# Patient Record
Sex: MALE | Race: WHITE | NOT HISPANIC OR LATINO | Employment: UNEMPLOYED | URBAN - METROPOLITAN AREA
[De-identification: names, ages, dates, MRNs, and addresses within clinical notes are randomized per-mention and may not be internally consistent; named-entity substitution may affect disease eponyms.]

---

## 2017-07-06 ENCOUNTER — HOSPITAL ENCOUNTER (EMERGENCY)
Facility: CLINIC | Age: 22
Discharge: HOME | End: 2017-07-06
Attending: INTERNAL MEDICINE | Admitting: INTERNAL MEDICINE

## 2017-07-06 VITALS
HEART RATE: 87 BPM | DIASTOLIC BLOOD PRESSURE: 60 MMHG | RESPIRATION RATE: 16 BRPM | WEIGHT: 201.13 LBS | BODY MASS INDEX: 29.28 KG/M2 | TEMPERATURE: 96.7 F | SYSTOLIC BLOOD PRESSURE: 124 MMHG | OXYGEN SATURATION: 98 %

## 2017-07-06 DIAGNOSIS — S09.8XXA OTHER SPECIFIED INJURIES OF HEAD, INITIAL ENCOUNTER: ICD-10-CM
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
## 2017-07-06 PROCEDURE — 99207 ZZC APP CREDIT; MD BILLING SHARED VISIT: CPT | Mod: Z6 | Performed by: INTERNAL MEDICINE

## 2017-07-06 PROCEDURE — 40000268 ZZH STATISTIC NO CHARGES: Performed by: INTERNAL MEDICINE

## 2017-07-07 NOTE — ED NOTES
"The patient appeared on the ED list as \"ready to be seen by MD\" in hallway 2. I did not find him in the hallway on several passes through the department. Charge nurse did not know where he was. After further investigation, the triage nurse states she called him several times in the lobby and that he did not respond, therefore was never brought back to the department.    He was never seen by the ED MD and apparently eloped from  the department after triage.     Jason Ruiz MD  07/06/17 2100    "

## 2023-12-13 ENCOUNTER — HOSPITAL ENCOUNTER (EMERGENCY)
Facility: CLINIC | Age: 28
Discharge: HOME | End: 2023-12-13
Attending: EMERGENCY MEDICINE | Admitting: EMERGENCY MEDICINE

## 2023-12-13 VITALS
WEIGHT: 190 LBS | BODY MASS INDEX: 25.73 KG/M2 | RESPIRATION RATE: 18 BRPM | HEIGHT: 72 IN | DIASTOLIC BLOOD PRESSURE: 85 MMHG | OXYGEN SATURATION: 99 % | SYSTOLIC BLOOD PRESSURE: 141 MMHG | HEART RATE: 100 BPM | TEMPERATURE: 98.2 F
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
## 2023-12-13 DIAGNOSIS — S01.85XA OPEN WOUND OF FACE DUE TO DOG BITE: ICD-10-CM

## 2023-12-13 PROCEDURE — 90471 IMMUNIZATION ADMIN: CPT | Performed by: EMERGENCY MEDICINE

## 2023-12-13 PROCEDURE — 90715 TDAP VACCINE 7 YRS/> IM: CPT | Performed by: EMERGENCY MEDICINE

## 2023-12-13 PROCEDURE — 99283 EMERGENCY DEPT VISIT LOW MDM: CPT | Performed by: EMERGENCY MEDICINE

## 2023-12-13 PROCEDURE — 99283 EMERGENCY DEPT VISIT LOW MDM: CPT | Mod: 25 | Performed by: EMERGENCY MEDICINE

## 2023-12-13 PROCEDURE — 250N000011 HC RX IP 250 OP 636: Performed by: EMERGENCY MEDICINE

## 2023-12-13 RX ORDER — AMOXICILLIN AND CLAVULANATE POTASSIUM 500; 125 MG/1; MG/1
1 TABLET, FILM COATED ORAL 3 TIMES DAILY
Qty: 30 TABLET | Refills: 0 | Status: SHIPPED | OUTPATIENT
Start: 2023-12-13 | End: 2023-12-23

## 2023-12-13 RX ADMIN — CLOSTRIDIUM TETANI TOXOID ANTIGEN (FORMALDEHYDE INACTIVATED), CORYNEBACTERIUM DIPHTHERIAE TOXOID ANTIGEN (FORMALDEHYDE INACTIVATED), BORDETELLA PERTUSSIS TOXOID ANTIGEN (GLUTARALDEHYDE INACTIVATED), BORDETELLA PERTUSSIS FILAMENTOUS HEMAGGLUTININ ANTIGEN (FORMALDEHYDE INACTIVATED), BORDETELLA PERTUSSIS PERTACTIN ANTIGEN, AND BORDETELLA PERTUSSIS FIMBRIAE 2/3 ANTIGEN 0.5 ML: 5; 2; 2.5; 5; 3; 5 INJECTION, SUSPENSION INTRAMUSCULAR at 13:10

## 2023-12-13 ASSESSMENT — ACTIVITIES OF DAILY LIVING (ADL): ADLS_ACUITY_SCORE: 33

## 2023-12-13 NOTE — ED PROVIDER NOTES
[facility] EMERGENCY DEPARTMENT ([facility])    12/13/23       ED PROVIDER NOTE    History     Chief Complaint   Patient presents with    Dog Bite     HPI  Miguel Chirinos is a 28 year old male with  whose last examination was more than 5 years ago who presents emerged part with complaints of a dog bite to his face. Patient states that he got into an argument with his girlfriend and his dog got excited and he tried to hug his dog to keep his dog under control but the dog bit him in the upper lip. The patient presents here to the ER for evaluation. Patient states that the dog has had his immunizations.  This part of the document was transcribed by Nancy Hanson Scribtiera.     Per [facility] records, patient's last Tdap was in 08/01/2012.    Past Medical History  No past medical history on file.    No past surgical history on file.    NO ACTIVE MEDICATIONS  permethrin (ELIMITE) 5 % cream      No Known Allergies    Family History    No family history on file.    Social History   Social History     Tobacco Use    Smoking status: Every Day     Packs/day: 1     Types: Cigarettes    Smokeless tobacco: Never   Substance Use Topics    Alcohol use: No    Drug use: Yes     Comment: marijuana         A medically appropriate review of systems was performed with pertinent positives and negatives noted in the HPI, and all other systems negative.    Physical Exam   BP: (!) 141/85  Pulse: 100  Temp: 98.2  F (36.8  C)  Resp: 18  Height: 182.9 cm (6')  Weight: 86.2 kg (190 lb)  SpO2: 99 %  Physical Exam  Vitals and nursing note reviewed.   HENT:      Head: Atraumatic.      Comments: Except for the perioral area  Eyes:      Extraocular Movements: Extraocular movements intact.      Pupils: Pupils are equal, round, and reactive to light.   Pulmonary:      Effort: No respiratory distress.   Musculoskeletal:         General: No deformity.      Cervical back: Neck supple.   Skin:     Comments: Patient has fairly superficial  bite marks to the upper lip 1 near the right nostril and 1 near the left nostril neither of which require surgical repair   Neurological:      General: No focal deficit present.      Mental Status: He is alert and oriented to person, place, and time.   Psychiatric:         Mood and Affect: Mood normal.         ED Course, Procedures, & Data      Procedures         Medications   Tdap (tetanus-diphtheria-acell pertussis) (ADACEL) injection 0.5 mL (has no administration in time range)     Orders Placed This Encounter   Procedures    Primary Care Referral    Special Care Nursing         Critical care was not performed.     Medical Decision Making  The patient's presentation was of low complexity (an acute and uncomplicated illness or injury).    The patient's evaluation involved:  history and exam without other MDM data elements    The patient's management necessitated moderate risk (prescription drug management including medications given in the ED).    Assessment & Plan      I have reviewed the nursing notes.     I have reviewed the findings, diagnosis, plan and need for follow up with the patient.    New Prescriptions    AMOXICILLIN-CLAVULANATE (AUGMENTIN) 500-125 MG TABLET    Take 1 tablet by mouth 3 times daily for 10 days       Final diagnoses:   Open wound of face due to dog bite     Observe your dog for illness    Fill your prescription and take as directed    Please make an appointment to follow up with your primary care referral or Your Primary Care Provider in 10- 14 days if you have any concerns.    Routine discharge instructions were given for this diagnosis    [facility] EMERGENCY DEPARTMENT  12/13/2023     Rc Rolon MD  12/13/23 4537

## 2023-12-13 NOTE — ED TRIAGE NOTES
Dog bite to face. Punctures to upper lip x4. Pt states dog has had one round of rabies vaccines. VSS

## 2023-12-13 NOTE — DISCHARGE INSTRUCTIONS
Observe your dog for illness    Fill your prescription and take as directed    Please make an appointment to follow up with your primary care referral or Your Primary Care Provider in 10- 14 days if you have any concerns.

## 2025-07-22 ENCOUNTER — HOSPITAL ENCOUNTER (EMERGENCY)
Facility: CLINIC | Age: 30
Discharge: OTHER INSTITUTION | End: 2025-07-23
Attending: EMERGENCY MEDICINE
Payer: MEDICAID

## 2025-07-22 DIAGNOSIS — R45.851 DEPRESSION WITH SUICIDAL IDEATION: ICD-10-CM

## 2025-07-22 DIAGNOSIS — F32.A DEPRESSION WITH SUICIDAL IDEATION: ICD-10-CM

## 2025-07-22 PROCEDURE — 99285 EMERGENCY DEPT VISIT HI MDM: CPT | Performed by: EMERGENCY MEDICINE

## 2025-07-22 ASSESSMENT — COLUMBIA-SUICIDE SEVERITY RATING SCALE - C-SSRS
4. HAVE YOU HAD THESE THOUGHTS AND HAD SOME INTENTION OF ACTING ON THEM?: YES
1. IN THE PAST MONTH, HAVE YOU WISHED YOU WERE DEAD OR WISHED YOU COULD GO TO SLEEP AND NOT WAKE UP?: YES
3. HAVE YOU BEEN THINKING ABOUT HOW YOU MIGHT KILL YOURSELF?: YES
5. HAVE YOU STARTED TO WORK OUT OR WORKED OUT THE DETAILS OF HOW TO KILL YOURSELF? DO YOU INTEND TO CARRY OUT THIS PLAN?: NO
2. HAVE YOU ACTUALLY HAD ANY THOUGHTS OF KILLING YOURSELF IN THE PAST MONTH?: YES
6. HAVE YOU EVER DONE ANYTHING, STARTED TO DO ANYTHING, OR PREPARED TO DO ANYTHING TO END YOUR LIFE?: YES

## 2025-07-22 ASSESSMENT — ACTIVITIES OF DAILY LIVING (ADL): ADLS_ACUITY_SCORE: 41

## 2025-07-23 ENCOUNTER — TELEPHONE (OUTPATIENT)
Dept: BEHAVIORAL HEALTH | Facility: CLINIC | Age: 30
End: 2025-07-23

## 2025-07-23 ENCOUNTER — HOSPITAL ENCOUNTER (INPATIENT)
Facility: CLINIC | Age: 30
End: 2025-07-23
Attending: PSYCHIATRY & NEUROLOGY | Admitting: PSYCHIATRY & NEUROLOGY
Payer: MEDICAID

## 2025-07-23 VITALS
WEIGHT: 200 LBS | SYSTOLIC BLOOD PRESSURE: 125 MMHG | RESPIRATION RATE: 17 BRPM | BODY MASS INDEX: 27.09 KG/M2 | TEMPERATURE: 97.4 F | DIASTOLIC BLOOD PRESSURE: 86 MMHG | HEIGHT: 72 IN | HEART RATE: 70 BPM | OXYGEN SATURATION: 99 %

## 2025-07-23 PROBLEM — F33.9 RECURRENT MAJOR DEPRESSION: Status: ACTIVE | Noted: 2025-07-23

## 2025-07-23 PROBLEM — R45.851 SUICIDAL IDEATION: Status: ACTIVE | Noted: 2025-07-23

## 2025-07-23 PROBLEM — F43.9 TRAUMA AND STRESSOR-RELATED DISORDER: Status: ACTIVE | Noted: 2025-07-23

## 2025-07-23 LAB
ALBUMIN SERPL BCG-MCNC: 3.9 G/DL (ref 3.5–5.2)
ALP SERPL-CCNC: 69 U/L (ref 40–150)
ALT SERPL W P-5'-P-CCNC: 12 U/L (ref 0–70)
AMPHETAMINES UR QL SCN: ABNORMAL
ANION GAP SERPL CALCULATED.3IONS-SCNC: 11 MMOL/L (ref 7–15)
AST SERPL W P-5'-P-CCNC: 17 U/L (ref 0–45)
BARBITURATES UR QL SCN: ABNORMAL
BASOPHILS # BLD AUTO: 0.1 10E3/UL (ref 0–0.2)
BASOPHILS NFR BLD AUTO: 1 %
BENZODIAZ UR QL SCN: ABNORMAL
BILIRUB SERPL-MCNC: 0.3 MG/DL
BUN SERPL-MCNC: 12.5 MG/DL (ref 6–20)
BZE UR QL SCN: ABNORMAL
CALCIUM SERPL-MCNC: 8.6 MG/DL (ref 8.8–10.4)
CANNABINOIDS UR QL SCN: ABNORMAL
CHLORIDE SERPL-SCNC: 105 MMOL/L (ref 98–107)
CREAT SERPL-MCNC: 0.82 MG/DL (ref 0.67–1.17)
EGFRCR SERPLBLD CKD-EPI 2021: >90 ML/MIN/1.73M2
EOSINOPHIL # BLD AUTO: 0.2 10E3/UL (ref 0–0.7)
EOSINOPHIL NFR BLD AUTO: 3 %
ERYTHROCYTE [DISTWIDTH] IN BLOOD BY AUTOMATED COUNT: 12.9 % (ref 10–15)
FENTANYL UR QL: ABNORMAL
GLUCOSE SERPL-MCNC: 100 MG/DL (ref 70–99)
HCO3 SERPL-SCNC: 24 MMOL/L (ref 22–29)
HCT VFR BLD AUTO: 40.9 % (ref 40–53)
HGB BLD-MCNC: 14.1 G/DL (ref 13.3–17.7)
IMM GRANULOCYTES # BLD: 0 10E3/UL
IMM GRANULOCYTES NFR BLD: 0 %
LYMPHOCYTES # BLD AUTO: 2.7 10E3/UL (ref 0.8–5.3)
LYMPHOCYTES NFR BLD AUTO: 34 %
MCH RBC QN AUTO: 31.4 PG (ref 26.5–33)
MCHC RBC AUTO-ENTMCNC: 34.5 G/DL (ref 31.5–36.5)
MCV RBC AUTO: 91 FL (ref 78–100)
MONOCYTES # BLD AUTO: 0.7 10E3/UL (ref 0–1.3)
MONOCYTES NFR BLD AUTO: 9 %
NEUTROPHILS # BLD AUTO: 4.1 10E3/UL (ref 1.6–8.3)
NEUTROPHILS NFR BLD AUTO: 53 %
NRBC # BLD AUTO: 0 10E3/UL
NRBC BLD AUTO-RTO: 0 /100
OPIATES UR QL SCN: ABNORMAL
PCP QUAL URINE (ROCHE): ABNORMAL
PLATELET # BLD AUTO: 226 10E3/UL (ref 150–450)
POTASSIUM SERPL-SCNC: 3.8 MMOL/L (ref 3.4–5.3)
PROT SERPL-MCNC: 6 G/DL (ref 6.4–8.3)
RBC # BLD AUTO: 4.49 10E6/UL (ref 4.4–5.9)
SODIUM SERPL-SCNC: 140 MMOL/L (ref 135–145)
WBC # BLD AUTO: 7.7 10E3/UL (ref 4–11)

## 2025-07-23 PROCEDURE — 85025 COMPLETE CBC W/AUTO DIFF WBC: CPT | Performed by: EMERGENCY MEDICINE

## 2025-07-23 PROCEDURE — 124N000002 HC R&B MH UMMC

## 2025-07-23 PROCEDURE — H2032 ACTIVITY THERAPY, PER 15 MIN: HCPCS

## 2025-07-23 PROCEDURE — 36415 COLL VENOUS BLD VENIPUNCTURE: CPT | Performed by: EMERGENCY MEDICINE

## 2025-07-23 PROCEDURE — 99223 1ST HOSP IP/OBS HIGH 75: CPT | Mod: AI | Performed by: PSYCHIATRY & NEUROLOGY

## 2025-07-23 PROCEDURE — 250N000013 HC RX MED GY IP 250 OP 250 PS 637

## 2025-07-23 PROCEDURE — 80307 DRUG TEST PRSMV CHEM ANLYZR: CPT | Performed by: EMERGENCY MEDICINE

## 2025-07-23 PROCEDURE — 93010 ELECTROCARDIOGRAM REPORT: CPT | Performed by: INTERNAL MEDICINE

## 2025-07-23 PROCEDURE — 80053 COMPREHEN METABOLIC PANEL: CPT | Performed by: EMERGENCY MEDICINE

## 2025-07-23 PROCEDURE — 250N000013 HC RX MED GY IP 250 OP 250 PS 637: Performed by: EMERGENCY MEDICINE

## 2025-07-23 RX ORDER — POLYETHYLENE GLYCOL 3350 17 G
2 POWDER IN PACKET (EA) ORAL
Status: DISCONTINUED | OUTPATIENT
Start: 2025-07-23 | End: 2025-07-23

## 2025-07-23 RX ORDER — OLANZAPINE 10 MG/1
10 TABLET, FILM COATED ORAL 3 TIMES DAILY PRN
Status: DISCONTINUED | OUTPATIENT
Start: 2025-07-23 | End: 2025-07-25 | Stop reason: HOSPADM

## 2025-07-23 RX ORDER — AMOXICILLIN 250 MG
1 CAPSULE ORAL 2 TIMES DAILY PRN
Status: DISCONTINUED | OUTPATIENT
Start: 2025-07-23 | End: 2025-07-25 | Stop reason: HOSPADM

## 2025-07-23 RX ORDER — ACETAMINOPHEN 325 MG/1
650 TABLET ORAL EVERY 4 HOURS PRN
Status: DISCONTINUED | OUTPATIENT
Start: 2025-07-23 | End: 2025-07-25 | Stop reason: HOSPADM

## 2025-07-23 RX ORDER — OLANZAPINE 10 MG/2ML
10 INJECTION, POWDER, FOR SOLUTION INTRAMUSCULAR 3 TIMES DAILY PRN
Status: DISCONTINUED | OUTPATIENT
Start: 2025-07-23 | End: 2025-07-25 | Stop reason: HOSPADM

## 2025-07-23 RX ORDER — MAGNESIUM HYDROXIDE/ALUMINUM HYDROXICE/SIMETHICONE 120; 1200; 1200 MG/30ML; MG/30ML; MG/30ML
30 SUSPENSION ORAL EVERY 4 HOURS PRN
Status: DISCONTINUED | OUTPATIENT
Start: 2025-07-23 | End: 2025-07-25 | Stop reason: HOSPADM

## 2025-07-23 RX ORDER — POLYETHYLENE GLYCOL 3350 17 G
4 POWDER IN PACKET (EA) ORAL
Status: DISCONTINUED | OUTPATIENT
Start: 2025-07-23 | End: 2025-07-25 | Stop reason: HOSPADM

## 2025-07-23 RX ORDER — POLYETHYLENE GLYCOL 3350 17 G
2 POWDER IN PACKET (EA) ORAL
Status: DISCONTINUED | OUTPATIENT
Start: 2025-07-23 | End: 2025-07-23 | Stop reason: HOSPADM

## 2025-07-23 RX ORDER — POLYETHYLENE GLYCOL 3350 17 G
4 POWDER IN PACKET (EA) ORAL
Status: DISCONTINUED | OUTPATIENT
Start: 2025-07-23 | End: 2025-07-23 | Stop reason: DRUGHIGH

## 2025-07-23 RX ORDER — HYDROXYZINE HYDROCHLORIDE 25 MG/1
25 TABLET, FILM COATED ORAL EVERY 4 HOURS PRN
Status: DISCONTINUED | OUTPATIENT
Start: 2025-07-23 | End: 2025-07-25 | Stop reason: HOSPADM

## 2025-07-23 RX ORDER — NICOTINE 21 MG/24HR
1 PATCH, TRANSDERMAL 24 HOURS TRANSDERMAL DAILY
Status: DISCONTINUED | OUTPATIENT
Start: 2025-07-23 | End: 2025-07-23 | Stop reason: HOSPADM

## 2025-07-23 RX ORDER — NICOTINE 21 MG/24HR
1 PATCH, TRANSDERMAL 24 HOURS TRANSDERMAL DAILY
Status: DISCONTINUED | OUTPATIENT
Start: 2025-07-24 | End: 2025-07-25 | Stop reason: HOSPADM

## 2025-07-23 RX ADMIN — NICOTINE POLACRILEX 4 MG: 2 LOZENGE ORAL at 22:13

## 2025-07-23 RX ADMIN — NICOTINE POLACRILEX 2 MG: 2 LOZENGE ORAL at 18:37

## 2025-07-23 RX ADMIN — NICOTINE POLACRILEX 2 MG: 2 LOZENGE ORAL at 00:03

## 2025-07-23 RX ADMIN — NICOTINE 1 PATCH: 21 PATCH, EXTENDED RELEASE TRANSDERMAL at 00:06

## 2025-07-23 RX ADMIN — NICOTINE 1 PATCH: 21 PATCH, EXTENDED RELEASE TRANSDERMAL at 08:02

## 2025-07-23 RX ADMIN — Medication 3 MG: at 22:33

## 2025-07-23 RX ADMIN — NICOTINE POLACRILEX 4 MG: 2 LOZENGE ORAL at 17:18

## 2025-07-23 RX ADMIN — NICOTINE POLACRILEX 4 MG: 2 LOZENGE ORAL at 19:48

## 2025-07-23 ASSESSMENT — ACTIVITIES OF DAILY LIVING (ADL)
ORAL_HYGIENE: INDEPENDENT
ADLS_ACUITY_SCORE: 17
ADLS_ACUITY_SCORE: 41
HYGIENE/GROOMING: INDEPENDENT
ADLS_ACUITY_SCORE: 41
ADLS_ACUITY_SCORE: 17
DIFFICULTY_EATING/SWALLOWING: NO
ADLS_ACUITY_SCORE: 41
DIFFICULTY_COMMUNICATING: NO
ADLS_ACUITY_SCORE: 17
ADLS_ACUITY_SCORE: 41
FALL_HISTORY_WITHIN_LAST_SIX_MONTHS: NO
HEARING_DIFFICULTY_OR_DEAF: NO
ADLS_ACUITY_SCORE: 41
TOILETING_ISSUES: NO
DRESS: INDEPENDENT
ADLS_ACUITY_SCORE: 41
WALKING_OR_CLIMBING_STAIRS_DIFFICULTY: NO
ADLS_ACUITY_SCORE: 17
ADLS_ACUITY_SCORE: 41
ADLS_ACUITY_SCORE: 41
DRESSING/BATHING_DIFFICULTY: NO
DOING_ERRANDS_INDEPENDENTLY_DIFFICULTY: NO
ADLS_ACUITY_SCORE: 17
ADLS_ACUITY_SCORE: 41
ADLS_ACUITY_SCORE: 17
CONCENTRATING,_REMEMBERING_OR_MAKING_DECISIONS_DIFFICULTY: YES
LAUNDRY: WITH SUPERVISION
ADLS_ACUITY_SCORE: 41
ADLS_ACUITY_SCORE: 41
WEAR_GLASSES_OR_BLIND: NO
ADLS_ACUITY_SCORE: 17
ADLS_ACUITY_SCORE: 17
CHANGE_IN_FUNCTIONAL_STATUS_SINCE_ONSET_OF_CURRENT_ILLNESS/INJURY: NO

## 2025-07-23 NOTE — PHARMACY-ADMISSION MEDICATION HISTORY
Please see Admission Medication History note completed by pharmacist on 7/23/25 under previous encounter at [facility] for information regarding prior to admission medications.
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
## 2025-07-23 NOTE — TELEPHONE ENCOUNTER
8:46 AM: Escalation through NM regarding admit for both Pts in queue for station 22.    8:52: NM reported understaffed during previous shift and will work on admit.    11:58 AM: Second escalation initiated for both Pts.    12:26 PM: NM informed Intake that the oncoming Residents did not know the patients were in queue so no admit orders were entered.  [place] to call report to Rhina DAO on [address].

## 2025-07-23 NOTE — ED PROVIDER NOTES
Emergency Department Note      History of Present Illness     Chief Complaint   Suicidal    HPI   Miguel Chirinos is a 29 year old male who presents to the emergency department for suicidal ideations. The patient states that he has been experiencing ongoing depression for several years but has never been formally diagnosed with anxiety or depression and is not on any medications. He reports that recently, he broke up with his partner in which she took his dog and he no longer has a place to stay, noting that his depression has worsened within the past few days. He notes that he has also been experiencing suicidal ideations without a plan and denies any recent self-harm but notes that 6 months ago, he attempted to hang himself at his mother's house. He adds that he smoked marijuana tonight but denies any other drug use and denies ETOH use. He endorses some ongoing nasal congestion. He adds that he has a hx of heartburn and is concerned that he has some peptic ulcers.    Independent Historian   None    Past Medical History     Medical History and Problem List   Appendicitis  Heartburn  Scabies    Medications   The patient is currently on no regular medications.    Surgical History  LAP appendectomy    Physical Exam     Patient Vitals for the past 24 hrs:   BP Temp Temp src Pulse Resp SpO2 Height Weight   07/22/25 2251 125/85 97.4  F (36.3  C) Temporal 90 18 98 % -- --   07/22/25 2250 -- -- -- -- -- -- 1.829 m (6') 90.7 kg (200 lb)     Physical Exam  General: Patient is alert, awake and interactive when I enter the room  Head: The scalp, face, and head appear normal  Eyes: Conjunctivae are normal  ENT: The nose is normal, Pinnae are normal, External acoustic canals are normal  Neck: Trachea midline  CV: Pulses are normal.   Resp: No respiratory distress   Musc: Normal muscular tone, moving all extremities.  Skin: No rash or lesions noted  Neuro:  Speech is normal and fluent. Face is symmetric.   Psych: Flat  affect.  Admits to suicidal ideation without plan.      Diagnostics     Lab Results   Labs Ordered and Resulted from Time of ED Arrival to Time of ED Departure   CBC WITH PLATELETS AND DIFFERENTIAL       Result Value    WBC Count 7.7      RBC Count 4.49      Hemoglobin 14.1      Hematocrit 40.9      MCV 91      MCH 31.4      MCHC 34.5      RDW 12.9      Platelet Count 226      % Neutrophils 53      % Lymphocytes 34      % Monocytes 9      % Eosinophils 3      % Basophils 1      % Immature Granulocytes 0      NRBCs per 100 WBC 0      Absolute Neutrophils 4.1      Absolute Lymphocytes 2.7      Absolute Monocytes 0.7      Absolute Eosinophils 0.2      Absolute Basophils 0.1      Absolute Immature Granulocytes 0.0      Absolute NRBCs 0.0     COMPREHENSIVE METABOLIC PANEL     Imaging   No orders to display     Independent Interpretation   None    ED Course      Medications Administered   Medications   nicotine (COMMIT) lozenge 2 mg (2 mg Buccal $Given 7/23/25 0003)   nicotine (NICODERM CQ) 21 MG/24HR 24 hr patch 1 patch (1 patch Transdermal $Patch/Med Applied 7/23/25 0006)     Discussion of Management   ED Mental Health, aBrbara Simpson with DEC    ED Course   ED Course as of 07/23/25 0047   Tue Jul 22, 2025   2257 I obtained history and examined the patient as noted above.      Wed Jul 23, 2025   0001 Spoke with [facility], regarding the patient. They recommend inpatient psych admission.       Additional Documentation  None    Medical Decision Making / Diagnosis     CMS Diagnoses: None    MIPS   None    MDM   Miguel Chirinos is a 29 year old male who presents to the emergency department with worsening depression and suicidal ideation.  Patient comes to the emergency department voluntarily but was placed on [facility].  He was evaluated by mental health assessment team who recommends inpatient hospitalization due to significant worsening depression and suicidal ideation.  He is not currently on anxiety or depression medication.  He  denies any recent actions of self-harm.  Patient will remain in the emergency department until inpatient bed can be obtained.      Disposition   Care of the patient was transferred to my colleague Dr. Walsh.     Diagnosis     ICD-10-CM    1. Depression with suicidal ideation  F32.A     R45.851         Scribe Disclosure:  I, Rafi Wetzel, am serving as a scribe at 10:51 PM on 7/22/2025 to document services personally performed by Andrzej Velazquez MD based on my observations and the provider's statements to me.        Andrzej Velazquez MD  07/23/25 0110

## 2025-07-23 NOTE — ED NOTES
Patient resting in bed. Even and unlabored breathing. Meal order taken and sent to nutrition. Calm and cooperative at this time.
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
## 2025-07-23 NOTE — ED NOTES
RN ED Mental Health Handoff Note    Voluntary    Does patient require 1:1? Yes    Hold and rights been given and documented for patient: Yes    Is the patient in BH scrubs? No -Personal Clothing    Has the patient been searched? Yes    Is the 15 minute observation tool up to date? Yes    Was patient issued a welcome folder? No -N/A    Room check completed this shift: Yes    PSS3 and [place] Assessment/Reassessment this shift:    C-SSRS ([place])      Date and Time Q1 Wished to be Dead (Past Month) Q2 Suicidal Thoughts (Past Month) Q3 Suicidal Thought Method Q4 Suicidal Intent without Specific Plan Q5 Suicide Intent with Specific Plan Q6 Suicide Behavior (Lifetime) If yes to Q6, within past 3 months? Level of Risk per Screen Level of Risk per Screen User   07/23/25 0113 1-->yes 1-->yes 1-->yes 1-->yes 0-->no 1-->yes 0-->no -- high risk CF   07/22/25 2251 1-->yes 1-->yes 1-->yes 1-->yes 0-->no 1-->yes 0-->no -- high risk CRW            Behavioral status of patient: Green    Code 21 called this shift? No    Use of restraints/seclusion this shift? No    Most recent vital signs:  Temp: 97.4  F (36.3  C) Temp src: Temporal BP: 125/85 Pulse: 90   Resp: 18 SpO2: 98 %        Medications:  Scheduled medication compliance? N/A    PRN Meds administered this shift? No    Medications   nicotine (COMMIT) lozenge 2 mg (2 mg Buccal $Given 7/23/25 0003)   nicotine (NICODERM CQ) 21 MG/24HR 24 hr patch 1 patch (1 patch Transdermal $Patch/Med Applied 7/23/25 0006)         ADLs    Meal Provided this shift? No    Hygiene items provided? No    ADLs completed? No    Date of last shower: PTA    Any significant events this shift? No    Any information that would be helpful in caring for this patient?      Family present/updated? Yes    Location of patient's belongings: Sent home with family, computer at bedside in patient belonging bag.    Critical Care Minutes:  Does the patient need critical care minutes documented? No

## 2025-07-23 NOTE — ED NOTES
Patient belonging returned to the patient. Patient vitals obtained. Report given to ems. Patient remains calm and cooperative.
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
## 2025-07-23 NOTE — ED TRIAGE NOTES
Pt here for thoughts of wanting to harm self, no plan. Hx of suicidal ideation, now having thoughts of wanting to hang self. Endorses being homeless and recent breakup with significant other. States has lifetime hx of suicidal attempts.

## 2025-07-23 NOTE — PHARMACY-ADMISSION MEDICATION HISTORY
Pharmacist Admission Medication History    Admission medication history is complete. The information provided in this note is only as accurate as the sources available at the time of the update.    Information Source(s): Patient via in-person    Pertinent Information: Pt states no current meds.    Changes made to PTA medication list:  Added: None  Deleted: permethrin cream  Changed: None    Allergies reviewed with patient and updates made in EHR: yes    Medication History Completed By: Emiliana Higginbotham RPH 7/23/2025 10:42 AM    No outpatient medications have been marked as taking for the 7/22/25 encounter (Hospital Encounter).
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
## 2025-07-23 NOTE — ED NOTES
IP MH Referral Acuity Rating Score (RARS)    LMHP complete at referral to IP MH, with DEC; and, daily while awaiting IP MH placement. Call score to PPS.  CRITERIA SCORING   New 72 HH and Involuntary for IP MH (not adolescent) 0/3   Boarding over 24 hours 0/1   Vulnerable adult at least 55+ with multiple co morbidities; or, Patient age 11 or under 0/1   Suicide ideation without relief of precipitating factors 1/1   Current plan for suicide 0/1   Current plan for homicide 0/1   Imminent risk or actual attempt to seriously harm another without relief of factors precipitating the attempt 0/1   Severe dysfunction in daily living (ex: complete neglect for self care, extreme disruption in vegetative function, extreme deterioration in social interactions) 1/1   Recent (last 2 weeks) or current physical aggression in the ED 0/1   Restraints or seclusion episode in ED 0/1   Verbal aggression, agitation, yelling, etc., while in the ED 0/1   Active psychosis with psychomotor agitation or catatonia 0/1   Need for constant or near constant redirection (from leaving, from others, etc).  0/1   Intrusive or disruptive behaviors 0/1   TOTAL 2

## 2025-07-24 VITALS
WEIGHT: 204.8 LBS | BODY MASS INDEX: 27.74 KG/M2 | HEART RATE: 68 BPM | HEIGHT: 72 IN | TEMPERATURE: 97.5 F | OXYGEN SATURATION: 99 % | RESPIRATION RATE: 17 BRPM | SYSTOLIC BLOOD PRESSURE: 121 MMHG | DIASTOLIC BLOOD PRESSURE: 82 MMHG

## 2025-07-24 LAB
ATRIAL RATE - MUSE: 63 BPM
CHOLEST SERPL-MCNC: 126 MG/DL
DIASTOLIC BLOOD PRESSURE - MUSE: NORMAL MMHG
EST. AVERAGE GLUCOSE BLD GHB EST-MCNC: 97 MG/DL
FOLATE SERPL-MCNC: 4.9 NG/ML (ref 4.6–34.8)
HBA1C MFR BLD: 5 %
HDLC SERPL-MCNC: 38 MG/DL
INTERPRETATION ECG - MUSE: NORMAL
LDLC SERPL CALC-MCNC: 46 MG/DL
NONHDLC SERPL-MCNC: 88 MG/DL
P AXIS - MUSE: 9 DEGREES
PR INTERVAL - MUSE: 134 MS
QRS DURATION - MUSE: 98 MS
QT - MUSE: 382 MS
QTC - MUSE: 390 MS
R AXIS - MUSE: 59 DEGREES
SYSTOLIC BLOOD PRESSURE - MUSE: NORMAL MMHG
T AXIS - MUSE: 40 DEGREES
TRIGL SERPL-MCNC: 209 MG/DL
TSH SERPL DL<=0.005 MIU/L-ACNC: 1.01 UIU/ML (ref 0.3–4.2)
VENTRICULAR RATE- MUSE: 63 BPM
VIT B12 SERPL-MCNC: 418 PG/ML (ref 232–1245)
VIT D+METAB SERPL-MCNC: 32 NG/ML (ref 20–50)

## 2025-07-24 PROCEDURE — 84443 ASSAY THYROID STIM HORMONE: CPT

## 2025-07-24 PROCEDURE — 82306 VITAMIN D 25 HYDROXY: CPT

## 2025-07-24 PROCEDURE — 90853 GROUP PSYCHOTHERAPY: CPT

## 2025-07-24 PROCEDURE — 93005 ELECTROCARDIOGRAM TRACING: CPT

## 2025-07-24 PROCEDURE — 83036 HEMOGLOBIN GLYCOSYLATED A1C: CPT

## 2025-07-24 PROCEDURE — 82746 ASSAY OF FOLIC ACID SERUM: CPT

## 2025-07-24 PROCEDURE — 82465 ASSAY BLD/SERUM CHOLESTEROL: CPT

## 2025-07-24 PROCEDURE — 250N000013 HC RX MED GY IP 250 OP 250 PS 637

## 2025-07-24 PROCEDURE — 99232 SBSQ HOSP IP/OBS MODERATE 35: CPT | Mod: GC | Performed by: STUDENT IN AN ORGANIZED HEALTH CARE EDUCATION/TRAINING PROGRAM

## 2025-07-24 PROCEDURE — 124N000002 HC R&B MH UMMC

## 2025-07-24 PROCEDURE — 82607 VITAMIN B-12: CPT

## 2025-07-24 PROCEDURE — 36415 COLL VENOUS BLD VENIPUNCTURE: CPT

## 2025-07-24 PROCEDURE — 97150 GROUP THERAPEUTIC PROCEDURES: CPT | Mod: GO

## 2025-07-24 RX ADMIN — NICOTINE POLACRILEX 4 MG: 2 LOZENGE ORAL at 16:17

## 2025-07-24 RX ADMIN — NICOTINE POLACRILEX 4 MG: 2 LOZENGE ORAL at 18:02

## 2025-07-24 RX ADMIN — NICOTINE POLACRILEX 4 MG: 2 LOZENGE ORAL at 15:09

## 2025-07-24 RX ADMIN — NICOTINE 1 PATCH: 21 PATCH, EXTENDED RELEASE TRANSDERMAL at 09:03

## 2025-07-24 RX ADMIN — Medication 3 MG: at 21:50

## 2025-07-24 RX ADMIN — NICOTINE POLACRILEX 4 MG: 2 LOZENGE ORAL at 09:04

## 2025-07-24 RX ADMIN — NICOTINE POLACRILEX 4 MG: 2 LOZENGE ORAL at 19:10

## 2025-07-24 RX ADMIN — NICOTINE POLACRILEX 4 MG: 2 LOZENGE ORAL at 10:42

## 2025-07-24 RX ADMIN — NICOTINE POLACRILEX 4 MG: 2 LOZENGE ORAL at 21:17

## 2025-07-24 RX ADMIN — NICOTINE POLACRILEX 4 MG: 2 LOZENGE ORAL at 12:36

## 2025-07-24 ASSESSMENT — ACTIVITIES OF DAILY LIVING (ADL)
ADLS_ACUITY_SCORE: 17
LAUNDRY: WITH SUPERVISION
ADLS_ACUITY_SCORE: 17
HYGIENE/GROOMING: INDEPENDENT
ADLS_ACUITY_SCORE: 17
DRESS: INDEPENDENT
ADLS_ACUITY_SCORE: 17
ORAL_HYGIENE: INDEPENDENT
ADLS_ACUITY_SCORE: 17

## 2025-07-24 NOTE — PLAN OF CARE
Problem: Adult Behavioral Health Plan of Care  Goal: Adheres to Safety Considerations for Self and Others  Outcome: Progressing     Problem: Suicide Risk  Goal: Absence of Self-Harm  Outcome: Progressing    Pt's VSS and denied having pain. Pt denied having anxiety/SI/SIB/HI/AVH in this shift. Pt endorsed having mild depression and stated that it has significantly improved. Pt is hopeful and positive on his progress; pt stated that he is getting many helpful resources from this hospital. Pt attended to evening group. Pt requested and received prn Nicotine lozenge. Pt requested and received prn Melatonin for sleep at 2150.      Goal Outcome Evaluation:    Plan of Care Reviewed With: patient

## 2025-07-24 NOTE — PLAN OF CARE
"Goal Outcome Evaluation:    Problem: Suicide Risk  Goal: Absence of Self-Harm  Outcome: Progressing     Pt presents calm, cooperative, and pleasant. Pt denies SI/HI/SIB, hallucinations, and anxiety, but still endorses depression stating \"that's why I'm here\". Pt reports overall feeling as though he is in a good mood and is appropriately social with others. Pt split time between room and milieu. Pt attended Group Therapy.     Pt did not report concerns with bowel/bladder. Pt denies pain. Pt requests PRN Nicotine.  VSS, medication compliant, behaviorally in control throughout shift.           "

## 2025-07-24 NOTE — PLAN OF CARE
07/23/25 2201   Patient Belongings   Did you bring any home meds/supplements to the hospital?  No   Patient Belongings locker   Patient Belongings Put in Hospital Secure Location (Security or Locker, etc.) clothing;laptop computer;shoes   Belongings Search Yes   Clothing Search Yes   Second Staff Los SHARMA       Patient Belonging:  Locker: 1 headphone, 1 laptop, 1 laptop , 1 book, 1 green shirt, 1 pair of jeans, 1 ID, 3 cigarettes, 1 lighter, 1 pair of shoes.   Security: None  With Patient: None    A               Admission:  I am responsible for any personal items that are not sent to the safe or pharmacy.  [facility] is not responsible for loss, theft or damage of any property in my possession.    Signature:  _________________________________ Date: _______  Time: _____                                              Staff Signature:  ____________________________ Date: ________  Time: _____      2nd Staff person, if patient is unable/unwilling to sign:    Signature: ________________________________ Date: ________  Time: _____     Discharge:  [facility] has returned all of my personal belongings:    Signature: _________________________________ Date: ________  Time: _____                                          Staff Signature:  ____________________________ Date: ________  Time: _____

## 2025-07-24 NOTE — PLAN OF CARE
Problem: Adult Behavioral Health Plan of Care  Goal: Adheres to Safety Considerations for Self and Others  Outcome: Progressing   Goal Outcome Evaluation:    Received  asleep, breathing unlabored and appeared comfortable. This is patients 1st night on the unit, no suicidal statement made. Kept on NPO for fasting labs in AM.  Slept for 7 hours

## 2025-07-24 NOTE — PLAN OF CARE
07/24/25 1434   Individualization/Patient Specific Goals   Patient Personal Strengths expressive of needs;expressive of emotions;interests/hobbies   Patient Vulnerabilities family/relationship conflict;substance abuse/addiction;limited support system   Interprofessional Rounds   Participants CTC;nursing;psychiatrist   Behavioral Team Discussion   Progress New admit   Anticipated length of stay 5-7 days   Continued Stay Criteria/Rationale New admit- ongoing symptoms, medication management, discharge planning   Medical/Physical See H&P   Precautions See below   Plan Psychiatric assessment/Medication management. Therapeutic Milieu. Individual care planning and after care planning. Patient to participate in unit groups and activities. Individual and group support on unit.   Rationale for change in precautions or plan N/A   Safety Plan See below         PRECAUTIONS AND SAFETY    Behavioral Orders   Procedures    Code 1 - Restrict to Unit    Routine Programming     As clinically indicated    Standard Monitoring: 15-Minute Checks     Every 15 minutes.       Safety  Safety WDL: WDL  Suicidality: Status 15, Minimal furniture in room, Minimal personal belongings in room
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
## 2025-07-24 NOTE — PLAN OF CARE
Rehab Group    Start time: 1025  End time: 1200  Patient time total: 60 minutes    came in and out of group session    #8 attended   Group Type: occupational therapy   Group Topic Covered: coping skills     Group Session Detail:  OT clinic     Patient Response/Contribution:  cooperative with task, attentive, and actively engaged     Patient Detail:    Pt actively participated in occupational therapy clinic to facilitate coping skill exploration, creative expression within personally meaningful activities, and clinical observation of social, cognitive, and kinesthetic performance skills. Pt response: Upon arrival to group, pt was oriented to group materials and project options, and was subsequently independent to initiate, gather materials, sequence, and adjust to workspace demands as needed. Demonstrated fair focus, planning, and attention to detail for selected creative expression task. Appeared slightly disorganized, though independently cleaned up task materials when he was finished with his project. Able to ask for assistance as needed, and frequently socialized with a male peer. He was observed laughing to himself once, though he was wearing headphones and listening to a radio station; unclear if he was preoccupied or laughing at something on the radio. Speech appeared pressured in conversation. Will continue to assess.       [id number] OT Group (2 or more in attendance)      Patient Active Problem List   Diagnosis    Scabies    Recurrent major depression    Trauma and stressor-related disorder    Suicidal ideation
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
## 2025-07-25 VITALS
OXYGEN SATURATION: 100 % | WEIGHT: 204.8 LBS | RESPIRATION RATE: 17 BRPM | TEMPERATURE: 97.1 F | HEART RATE: 62 BPM | DIASTOLIC BLOOD PRESSURE: 78 MMHG | HEIGHT: 72 IN | BODY MASS INDEX: 27.74 KG/M2 | SYSTOLIC BLOOD PRESSURE: 116 MMHG

## 2025-07-25 PROCEDURE — 99238 HOSP IP/OBS DSCHRG MGMT 30/<: CPT | Mod: GC | Performed by: PSYCHIATRY & NEUROLOGY

## 2025-07-25 PROCEDURE — 97150 GROUP THERAPEUTIC PROCEDURES: CPT | Mod: GO

## 2025-07-25 PROCEDURE — 250N000013 HC RX MED GY IP 250 OP 250 PS 637

## 2025-07-25 RX ADMIN — NICOTINE 1 PATCH: 21 PATCH, EXTENDED RELEASE TRANSDERMAL at 08:02

## 2025-07-25 RX ADMIN — NICOTINE POLACRILEX 4 MG: 2 LOZENGE ORAL at 11:28

## 2025-07-25 RX ADMIN — NICOTINE POLACRILEX 4 MG: 2 LOZENGE ORAL at 12:43

## 2025-07-25 RX ADMIN — NICOTINE POLACRILEX 4 MG: 2 LOZENGE ORAL at 08:02

## 2025-07-25 ASSESSMENT — ACTIVITIES OF DAILY LIVING (ADL)
ADLS_ACUITY_SCORE: 17
DRESS: INDEPENDENT;SCRUBS (BEHAVIORAL HEALTH)
ADLS_ACUITY_SCORE: 17
ADLS_ACUITY_SCORE: 17
ORAL_HYGIENE: INDEPENDENT
HYGIENE/GROOMING: HANDWASHING;INDEPENDENT;SHOWER
ADLS_ACUITY_SCORE: 17

## 2025-07-25 NOTE — DISCHARGE INSTRUCTIONS
Behavioral Discharge Planning and Instructions    Summary: You were admitted on 7/23/2025  due to Suicidal Ideations.  You were treated by Dr. Osorio and discharged on 07/25/2025 from [facility]    Main Diagnosis: Major Depressive Disorder    Health Care Follow-up:     Psychiatry/Medication Management:  [facility]  [address], [address]  [place] [address]  P: [phone] F: [phone]  Initial Appointment: Tuesday 8/5 3:25pm, in person  Follow Up Appointment: Wednesday 8/27 12:30pm, follow up     DBT Program:  [facility]  [address], [address]  [place] [address]  P: [phone] F: [phone]  Added to wait list- will reach out when spot is available     Resources:  -Referred to [facility] Case Management, will call when ready to schedule  -Adult Shelter [facility]: [phone], call to inquire about shelter availability     Patient Navigation Hub:   [facility] work to be your point-of-contact for trustworthy and compassionate care from Inpatient services to [facility] Programmatic Care. We will provide resources and communication to help guide you into programmatic care. Ultimately, our goal is to be the one-stop-shop of communication, coordination, and support for your journey to programmatic care.    Phone: [phone]    Information will be faxed to your outpatient providers to ensure a healthy continuity of care for you.     Attend all scheduled appointments with your outpatient providers. Call at least 24 hours in advance if you need to reschedule an appointment to ensure continued access to your outpatient providers.     Major Treatments, Procedures and Findings:  You were provided with: a psychiatric assessment, medication evaluation and/or management, group therapy, individual therapy, and milieu management    Symptoms to Report: mood getting worse or thoughts of suicide    Early warning signs can  include: increased thoughts or behaviors of suicide or self-harm     Safety and Wellness:  Take all medicines as directed.  Make no changes unless your doctor suggests them.      Follow treatment recommendations.  Refrain from alcohol and non-prescribed drugs.  If there is a concern for safety, call 911.    Resources:   Mental Health Crisis Resources  Throughout [place]: call **CRISIS (**851959)  Crisis Text Line: is available for free, 24/7 by texting [place] to 884991  Suicide Awareness Voices of Education (SAVE) (www.save.org): 632-191-CCWK (8607)  The [place] Suicide Prevention Lifeline is now: 988 Suicide and Crisis Lifeline. Call 988 anytime.  National [place] on Mental Illness (www.[place].seema.org): 797.199.8981 or 176-556-3125.  Dxkh8jhhe: text the word LIFE to 67752 for immediate support and crisis intervention  Mental Health Consumer/Survivor Network of [place] (www.mhcsn.net): 487.979.9300 or 407-112-9067  Mental Health Association of [place] (www.mentalhealth.org): 170.216.5038 or 720-642-4275  Peer Support Connection [place] Warmline (PSC) 1-665.616.4713 Available from 5pm - 9am (7 days a week/365 days a year)  [facility] 1-680.383.9922 Community Outreach for Psych Emergencies  {:PHR,SUDAVSRESOURCES}    General Medication Instructions:   See your medication sheet(s) for instructions.   Take all medicines as directed.  Make no changes unless your doctor suggests them.   Go to all your doctor visits.  Be sure to have all your required lab tests. This way, your medicines can be refilled on time.  Do not use any drugs not prescribed by your doctor.  Avoid alcohol.    Advance Directives:   Scanned document on file with [employer]? No scanned doc  Is document scanned? Pt states no documents  Honoring Choices Your Rights Handout: Informed and given  Was more information offered? Pt declined    The Treatment team has appreciated the opportunity to work with you. If you have any questions or concerns about your recent admission,  you can contact the unit which can receive your call 24 hours a day, 7 days a week. They will be able to get in touch with a Provider if needed. The unit number is [phone] .

## 2025-07-25 NOTE — PLAN OF CARE
Team Note Due:  Thursday    Assessment/Intervention/Current Symtoms and Care Coordination:  Chart review and met with team, discussed pt progress, symptomology, and response to treatment.  Discussed the discharge plan and any potential impediments to discharge.    -Scheduled outpatient medication management and referred for DBT through [facility]  -Referred for [facility] case management        Discharge Plan or Goal:  Home with outpatient services     Barriers to Discharge:  None     Referral Status:  Case Management- will call Enrique when ready to schedule      Legal Status:  Voluntary       Contacts (include HERMINIA status):  Other contact information (family, friends, SO) and HERMINIA status: Elsi Chatterjee, cousin, [phone]; Chrissy De La Rosa, mother, [phone]- No HERMINIA for either       Upcoming Meetings and Dates/Important Information and next steps:  Discharge today

## 2025-07-25 NOTE — PLAN OF CARE
Rehab Group    Start time: 1020  End time: 1205  Patient time total: 45 minutes    came in and out of group session    #8 attended   Group Type: occupational therapy   Group Topic Covered: coping skills     Group Session Detail:  OT clinic     Patient Response/Contribution:  cooperative with task, attentive, and actively engaged     Patient Detail:    Pt actively participated in occupational therapy clinic to facilitate coping skill exploration, creative expression within personally meaningful activities, and clinical observation of social, cognitive, and kinesthetic performance skills. Pt response: Independent to initiate, gather materials, sequence, and adjust to workspace demands as needed. Demonstrated fair focus, planning, and problem solving for selected creative expression task. Able to ask for assistance as needed, and socialized with peers and staff.       [address] OT Group (2 or more in attendance)      Patient Active Problem List   Diagnosis    Scabies    Recurrent major depression    Trauma and stressor-related disorder    Suicidal ideation

## 2025-07-25 NOTE — PROGRESS NOTES
"Pt is discharged as per AVS at 1305; has all belongings, no discharge meds. He verbalizes understanding of discharge instructions, and remains in stable mood. Per pt, his cousin will transport him to \"a peggy's place,\" where he will be staying. Vss, no somatic concerns.  "

## 2025-07-25 NOTE — PLAN OF CARE
"Goal Outcome Evaluation:    Pt is calm and in good spirits, affect full range. His mood is \"pretty hopeful.\" Pt states he'll attend all grps, is \"very social,\" in and out of the milieu. His goal for the day is \"to talk to my SW, and set up therapy for when I go.\" Pt is talking about possibly discharging today or tomorrow.                            "

## 2025-07-25 NOTE — PLAN OF CARE
Problem: Sleep Disturbance  Goal: Adequate Sleep/Rest  Outcome: Progressing    Pt slept a total of 7 hours based on Q 15 min rounds in this shift. Even and unlabored respirations noted. No medication was requested or given in this shift.      Goal Outcome Evaluation:    Plan of Care Reviewed With: patient

## 2025-07-25 NOTE — PLAN OF CARE
Group Attendance:  attended partial group    Time session began: 1715  Time session ended: 1800  Patient's total time in group: 40    Total # Attendees   9   Group Type psychotherapeutic     Group Topic Covered emotional regulation, symptom management, healthy coping skills, mindfulness, and relaxation and grounding techniques/coping skills     Group Session Detail Group members checked in with how they are feeling. Writer discussed the benefits of using drawing as coping skill, gave members a small piece of paper to start an exercise in doodling. Group members checked in with what they noticed during the activity.      Patient's response to the group topic/interactions:  positive affect, cooperative with task, organized, listened actively, expressed understanding of topic, attentive, and worked intermittently     Patient Details: Pt attended group, checked in briefly, supported others who checked in, and worked on his project a little bit. Pt was more talkative and shared stories with the group. .           72979 - Group psychotherapy - 1 Session  Patient Active Problem List   Diagnosis    Scabies    Recurrent major depression    Trauma and stressor-related disorder    Suicidal ideation